# Patient Record
Sex: MALE | Race: WHITE | Employment: FULL TIME | URBAN - METROPOLITAN AREA
[De-identification: names, ages, dates, MRNs, and addresses within clinical notes are randomized per-mention and may not be internally consistent; named-entity substitution may affect disease eponyms.]

---

## 2018-01-25 ENCOUNTER — OFFICE VISIT (OUTPATIENT)
Dept: INTERNAL MEDICINE CLINIC | Age: 48
End: 2018-01-25

## 2018-01-25 VITALS
SYSTOLIC BLOOD PRESSURE: 115 MMHG | BODY MASS INDEX: 26.7 KG/M2 | RESPIRATION RATE: 18 BRPM | OXYGEN SATURATION: 98 % | DIASTOLIC BLOOD PRESSURE: 75 MMHG | TEMPERATURE: 98.1 F | HEART RATE: 61 BPM | WEIGHT: 186.5 LBS | HEIGHT: 70 IN

## 2018-01-25 DIAGNOSIS — M62.830 SPASM OF BACK MUSCLES: Primary | ICD-10-CM

## 2018-01-25 DIAGNOSIS — E78.5 HYPERLIPIDEMIA LDL GOAL <100: Chronic | ICD-10-CM

## 2018-01-25 NOTE — MR AVS SNAPSHOT
[facility] 
 
 
 [address] [place] [address] [facility] 
[phone] Patient: Anna Mack MRN: [id number] SUJ:[id number] Visit Information Date & Time Provider Department Dept. Phone Encounter #  
 1/25/2018 10:45 AM Zbigniew Rodgers MD [facility] [address] [facility] [id number] Follow-up Instructions Return in about 1 year (around 1/25/2019). Upcoming Health Maintenance Date Due Influenza Age 5 to Adult 8/1/2017 COLONOSCOPY 10/4/2021 DTaP/Tdap/Td series (2 - Td) 5/30/2024 Allergies as of 1/25/2018  Review Complete On: 1/25/2018 By: Grisel Acosta LPN Severity Noted Reaction Type Reactions Alpha-d-galactosidase  05/30/2014    Hives Headache and stomach pain Aspirin  05/31/2011   Side Effect Hives, Itching Current Immunizations  Reviewed on 11/3/2016 Name Date Influenza Vaccine 9/30/2013 Influenza Vaccine PF 11/18/2015 Tdap 5/30/2014 Not reviewed this visit You Were Diagnosed With   
  
 Codes Comments Spasm of back muscles    -  Primary ICD-10-CM: Z99.111 ICD-9-CM: 724.8 Hyperlipidemia LDL goal <100     ICD-10-CM: E78.5 ICD-9-CM: 272.4 Vitals BP Pulse Temp Resp Height(growth percentile) Weight(growth percentile) 115/75 (BP 1 Location: Left arm, BP Patient Position: Sitting) 61 98.1 °F (36.7 °C) (Oral) 18 5' 10\" (1.778 m) 186 lb 8 oz (84.6 kg) SpO2 BMI Smoking Status 98% 26.76 kg/m2 Never Smoker Vitals History BMI and BSA Data Body Mass Index Body Surface Area  
 26.76 kg/m 2 2.04 m 2 Preferred Pharmacy Pharmacy Name Phone [facility] [address]  [facility] [phone] Your Updated Medication List  
  
Notice  As of 1/25/2018 11:15 AM  
 You have not been prescribed any medications. We Performed the Following LIPID PANEL [21946 CPT(R)] Follow-up Instructions Return in about 1 year (around 1/25/2019). Introducing [facility]! Dear Eleonora Stratton: 
Thank you for requesting a [employer] account. Our records indicate that you already have an active [employer] account. You can access your account anytime at [email or web address] Did you know that you can access your hospital and ER discharge instructions at any time in [employer]? You can also review all of your test results from your hospital stay or ER visit. Additional Information If you have questions, please visit the Frequently Asked Questions section of the [employer] website at [email or web address] Remember, [employer] is NOT to be used for urgent needs. For medical emergencies, dial 911. Now available from your iPhone and Android! Please provide this summary of care documentation to your next provider. Your primary care clinician is listed as Sara Lin. If you have any questions after today's visit, please call [phone].

## 2018-01-25 NOTE — PROGRESS NOTES
HISTORY OF PRESENT ILLNESS  Madeline Eden is a 52 y.o. male. HPI   Problem visit:  Madeline Eden is here for complaint of mid R T spine soreness in certain positions. Problem began 3 month(s) ago. Severity is mild  Character of problem: tightness, soreness with lying, twisting torso  Position change makes the problem worse. nothing makes the problem better. Associated symptoms include: no swelling, hx of trapezius tightness  Treatments tried include: medication not used    Hyperlipidemia:  Madeline Eden is following up on his dyslipidemia. Cardiovascular risks for him are: LDL goal is under 130. Currently he takes  , nothing  Lab Results   Component Value Date/Time    Cholesterol, total 238 11/18/2016 10:07 AM    Cholesterol, total 273 05/30/2014 12:11 PM    HDL Cholesterol 53 11/18/2016 10:07 AM    HDL Cholesterol 49 05/30/2014 12:11 PM    LDL, calculated 161 11/18/2016 10:07 AM    LDL, calculated 185 05/30/2014 12:11 PM    Triglyceride 122 11/18/2016 10:07 AM    Triglyceride 195 05/30/2014 12:11 PM     No results found for: GPT, ALT, SGOT, GGT, GGTP, AP, APIT, APX, CBIL, TBIL, TBILI      Patient Active Problem List   Diagnosis Code    Diverticulitis K57.92    Headache, common migraine G43.009    Perennial allergic rhinitis J30.89    Hyperlipidemia LDL goal <100 E78.5     Past Medical History:   Diagnosis Date    Ill-defined condition     high colesterol     Allergies   Allergen Reactions    Alpha-D-Galactosidase Hives     Headache and stomach pain    Aspirin Hives and Itching     No current outpatient prescriptions on file prior to visit. No current facility-administered medications on file prior to visit. Social History   Substance Use Topics    Smoking status: Never Smoker    Smokeless tobacco: Never Used    Alcohol use 0.6 oz/week     0 Glasses of wine, 1 Cans of beer per week             ROS    Physical Exam   Constitutional: He appears well-developed and well-nourished.  No distress. /75 (BP 1 Location: Left arm, BP Patient Position: Sitting)  Pulse 61  Temp 98.1 °F (36.7 °C) (Oral)   Resp 18  Ht 5' 10\" (1.778 m)  Wt 186 lb 8 oz (84.6 kg)  SpO2 98%  BMI 26.76 kg/m2Body mass index is 26.76 kg/(m^2). HENT:   Mouth/Throat: Oropharynx is clear and moist.   Neck: No JVD present. Carotid bruit is not present. Cardiovascular: Normal rate, regular rhythm, normal heart sounds and intact distal pulses. Pulmonary/Chest: Effort normal and breath sounds normal.   Musculoskeletal: He exhibits no edema. Thoracic back: He exhibits no tenderness, no swelling, no edema, no pain and no spasm. Back:    Area of reported discomfort where depicted. ? Mild prominence of paraspinal musculature there. No other changes. Neurological: He is alert. Skin: Skin is warm and dry. He is not diaphoretic. Nursing note and vitals reviewed. ASSESSMENT and PLAN  Diagnoses and all orders for this visit:    1. Spasm of back muscles - refer for massage therapy. 2. Hyperlipidemia LDL goal <100 -recheck now. -     LIPID PANEL      Follow-up Disposition:  Return in about 1 year (around 1/25/2019).

## 2018-02-03 LAB
CHOLEST SERPL-MCNC: 243 MG/DL (ref 100–199)
HDLC SERPL-MCNC: 51 MG/DL
INTERPRETATION, 910389: NORMAL
LDLC SERPL CALC-MCNC: 174 MG/DL (ref 0–99)
TRIGL SERPL-MCNC: 90 MG/DL (ref 0–149)
VLDLC SERPL CALC-MCNC: 18 MG/DL (ref 5–40)

## 2018-07-31 ENCOUNTER — PATIENT MESSAGE (OUTPATIENT)
Dept: INTERNAL MEDICINE CLINIC | Age: 48
End: 2018-07-31

## 2023-01-13 ENCOUNTER — TRANSCRIBE ORDER (OUTPATIENT)
Dept: SCHEDULING | Age: 53
End: 2023-01-13

## 2023-01-13 DIAGNOSIS — Z13.6 ENCOUNTER FOR SCREENING FOR CARDIOVASCULAR DISORDERS: Primary | ICD-10-CM

## 2023-02-07 ENCOUNTER — HOSPITAL ENCOUNTER (OUTPATIENT)
Dept: CT IMAGING | Age: 53
Discharge: HOME | End: 2023-02-07
Attending: FAMILY MEDICINE
Payer: SELF-PAY

## 2023-02-07 DIAGNOSIS — Z13.6 ENCOUNTER FOR SCREENING FOR CARDIOVASCULAR DISORDERS: ICD-10-CM

## 2023-02-07 PROCEDURE — 75571 CT HRT W/O DYE W/CA TEST: CPT
